# Patient Record
Sex: FEMALE | Race: WHITE | URBAN - METROPOLITAN AREA
[De-identification: names, ages, dates, MRNs, and addresses within clinical notes are randomized per-mention and may not be internally consistent; named-entity substitution may affect disease eponyms.]

---

## 2022-05-31 ENCOUNTER — NEW PATIENT (OUTPATIENT)
Dept: URBAN - METROPOLITAN AREA CLINIC 1 | Facility: CLINIC | Age: 87
End: 2022-05-31

## 2022-05-31 DIAGNOSIS — H40.023: ICD-10-CM

## 2022-05-31 DIAGNOSIS — Z96.1: ICD-10-CM

## 2022-05-31 DIAGNOSIS — Z79.899: ICD-10-CM

## 2022-05-31 DIAGNOSIS — E11.9: ICD-10-CM

## 2022-05-31 PROCEDURE — 92015 DETERMINE REFRACTIVE STATE: CPT

## 2022-05-31 PROCEDURE — 99204 OFFICE O/P NEW MOD 45 MIN: CPT

## 2022-05-31 PROCEDURE — 92134 CPTRZ OPH DX IMG PST SGM RTA: CPT

## 2022-05-31 PROCEDURE — 92133 CPTRZD OPH DX IMG PST SGM ON: CPT

## 2022-05-31 ASSESSMENT — VISUAL ACUITY
OD_CC: J1+
OS_CC: 20/20
OS_CC: J1+
OD_CC: 20/20

## 2022-05-31 ASSESSMENT — TONOMETRY
OD_IOP_MMHG: 12
OS_IOP_MMHG: 12

## 2022-05-31 NOTE — PATIENT DISCUSSION
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 Patient is considered high risk. Condition was discussed with patient and patient understands. Will continue to monitor patient for any progression in condition. Patient was advised to call us with any problems, questions, or concerns.

## 2022-05-31 NOTE — PATIENT DISCUSSION
(without) Plaquenil without evidence of Toxicity or Plaquenil retinopathy on High Risk Medication. [id number] Paty Kaur for patient to remain on Plaquenil. MAC OCT WNL OU.

## 2022-12-01 ENCOUNTER — FOLLOW UP (OUTPATIENT)
Dept: URBAN - METROPOLITAN AREA CLINIC 1 | Facility: CLINIC | Age: 87
End: 2022-12-01

## 2022-12-01 VITALS — BODY MASS INDEX: 26.5 KG/M2 | HEIGHT: 62 IN | WEIGHT: 144 LBS

## 2022-12-01 PROCEDURE — 99213 OFFICE O/P EST LOW 20 MIN: CPT

## 2022-12-01 PROCEDURE — 92083 EXTENDED VISUAL FIELD XM: CPT

## 2022-12-01 ASSESSMENT — TONOMETRY
OD_IOP_MMHG: 10
OS_IOP_MMHG: 11

## 2022-12-01 ASSESSMENT — VISUAL ACUITY
OD_CC: 20/20
OS_CC: J1
OD_CC: J1
OS_CC: 20/20

## 2022-12-01 NOTE — PATIENT DISCUSSION
(CD 0.80 OU) HVF WNL OU. IOP Stable. Patient is considered high risk. Condition was discussed with patient and patient understands. Will continue to monitor patient for any progression in condition. Patient was advised to call us with any problems, questions, or concerns.

## 2022-12-01 NOTE — PATIENT DISCUSSION
(without) Plaquenil without evidence of Toxicity or Plaquenil retinopathy on High Risk Medication. [id number] Paty Kaur for patient to remain on Plaquenil. TYLERF WNL OU.

## 2023-07-13 ENCOUNTER — COMPREHENSIVE EXAM (OUTPATIENT)
Dept: URBAN - METROPOLITAN AREA CLINIC 1 | Facility: CLINIC | Age: 88
End: 2023-07-13

## 2023-07-13 DIAGNOSIS — Z96.1: ICD-10-CM

## 2023-07-13 DIAGNOSIS — E11.9: ICD-10-CM

## 2023-07-13 DIAGNOSIS — H04.123: ICD-10-CM

## 2023-07-13 DIAGNOSIS — H40.023: ICD-10-CM

## 2023-07-13 DIAGNOSIS — H16.143: ICD-10-CM

## 2023-07-13 DIAGNOSIS — H35.361: ICD-10-CM

## 2023-07-13 DIAGNOSIS — M06.9: ICD-10-CM

## 2023-07-13 DIAGNOSIS — Z79.899: ICD-10-CM

## 2023-07-13 PROCEDURE — 92133 CPTRZD OPH DX IMG PST SGM ON: CPT

## 2023-07-13 PROCEDURE — 99214 OFFICE O/P EST MOD 30 MIN: CPT

## 2023-07-13 ASSESSMENT — VISUAL ACUITY
OS_BAT: 20/30
OS_CC: J1
OD_CC: J1+
OD_BAT: 20/30
OD_CC: 20/20
OS_CC: 20/20

## 2023-07-13 ASSESSMENT — TONOMETRY
OS_IOP_MMHG: 13
OD_IOP_MMHG: 14

## 2024-03-29 ENCOUNTER — FOLLOW UP (OUTPATIENT)
Dept: URBAN - METROPOLITAN AREA CLINIC 1 | Facility: CLINIC | Age: 89
End: 2024-03-29

## 2024-03-29 DIAGNOSIS — E11.9: ICD-10-CM

## 2024-03-29 DIAGNOSIS — M06.9: ICD-10-CM

## 2024-03-29 DIAGNOSIS — Z79.899: ICD-10-CM

## 2024-03-29 PROCEDURE — 92083 EXTENDED VISUAL FIELD XM: CPT

## 2024-03-29 PROCEDURE — 99213 OFFICE O/P EST LOW 20 MIN: CPT

## 2024-03-29 ASSESSMENT — VISUAL ACUITY
OD_CC: 20/20
OS_CC: 20/20

## 2024-03-29 ASSESSMENT — TONOMETRY
OS_IOP_MMHG: 12
OD_IOP_MMHG: 12